# Patient Record
Sex: MALE | Race: OTHER | NOT HISPANIC OR LATINO | URBAN - METROPOLITAN AREA
[De-identification: names, ages, dates, MRNs, and addresses within clinical notes are randomized per-mention and may not be internally consistent; named-entity substitution may affect disease eponyms.]

---

## 2018-10-16 ENCOUNTER — EMERGENCY (EMERGENCY)
Facility: HOSPITAL | Age: 28
LOS: 1 days | Discharge: HOME | End: 2018-10-16
Attending: EMERGENCY MEDICINE
Payer: MEDICAID

## 2018-10-16 VITALS
HEART RATE: 65 BPM | HEIGHT: 66 IN | TEMPERATURE: 99 F | OXYGEN SATURATION: 96 % | RESPIRATION RATE: 16 BRPM | DIASTOLIC BLOOD PRESSURE: 91 MMHG | WEIGHT: 160.06 LBS | SYSTOLIC BLOOD PRESSURE: 162 MMHG

## 2018-10-16 PROCEDURE — 99283 EMERGENCY DEPT VISIT LOW MDM: CPT

## 2018-10-16 PROCEDURE — 72131 CT LUMBAR SPINE W/O DYE: CPT | Mod: 26

## 2018-10-16 PROCEDURE — 99284 EMERGENCY DEPT VISIT MOD MDM: CPT | Mod: 25

## 2018-10-16 PROCEDURE — 72131 CT LUMBAR SPINE W/O DYE: CPT

## 2018-10-16 NOTE — ED PROVIDER NOTE - CONSTITUTIONAL, MLM
normal... Uncomfortable appearing male, well nourished, awake, alert, oriented to person, place, time/situation and in mild apparent distress.

## 2018-10-16 NOTE — ED ADULT TRIAGE NOTE - WEIGHT IN KG
Health Maintenance Summary     Topic Due On Due Status Completed On    Colorectal Cancer Screening - Colonoscopy Oct 3, 2017 Overdue     IMMUNIZATION - DTaP/Tdap/Td Feb 20, 2023 Not Due Feb 20, 2013    Immunization-Influenza Sep 1, 2017 Overdue Jan 20, 2015     Oct 3, 1979 Overdue           Patient is due for topics as listed above, he wishes to proceed at this time, order (s) placed and patient given informationVaccine Information Statement(s) for Influenza (Inactivated) given and reviewed, questions answered, verbal consent given by Patient for injection(s) administered. Patient tolerated without incident. See immunization grid for documentation.    1.  Does the patient have a moderate to severe fever?  NO  2.  Has the patient had a serious reaction to a flu shot before?  NO  3.  Has the patient ever had Guillain-Crosby Syndrome within 6 weeks of a previous flu shot?  NO  4.  Does the patient have a serious allergy to eggs?  NO  5.  Does the patient have an allergy to latex?  NO       Note: This applies to [employer] Fluzone prefilled syringes only  6.  If person is answering for a child, is the child less than 6 months of age?  NO    A \"YES\" answer to any question above means the patient is not eligible to receive the vaccine.             .     72.6

## 2018-10-16 NOTE — ED PROVIDER NOTE - OBJECTIVE STATEMENT
29 yo male with no significant PMHx, slipped and fell while walking down slick stairs last evening, landed on his right lower back and since that time has had sharp, non radiating, low back pain w/o associated symptoms such as bowel or bladder disturbances. No hematuria. Did not hit head. No CP or SOB.

## 2018-10-16 NOTE — ED ADULT NURSE NOTE - CHPI ED NUR SYMPTOMS NEG
no difficulty bearing weight/no tingling/no abrasion/no stiffness/no fever/no numbness/no bruising/no deformity

## 2018-10-16 NOTE — ED ADULT NURSE NOTE - OBJECTIVE STATEMENT
received pt in FT Pt A&O c/o lower back pain after tripping and falling down 8 stairs on buttocks last night Denies hitting head. received pt in FT Pt A&O c/o lower back pain after tripping and falling down 8 stairs on buttocks last night Denies hitting head. Pt seen by dr Pastor

## 2019-08-20 NOTE — ED PROVIDER NOTE - CPE EDP EYES NORM
Patient brought to appointment by mother and father  Best phone number [phone]  Ok to leave detailed message.    Electronically Signed by: Edna Childress CMA       normal...

## 2021-05-17 ENCOUNTER — EMERGENCY (EMERGENCY)
Facility: HOSPITAL | Age: 31
LOS: 1 days | Discharge: HOME | End: 2021-05-17
Attending: EMERGENCY MEDICINE | Admitting: EMERGENCY MEDICINE
Payer: COMMERCIAL

## 2021-05-17 VITALS
HEART RATE: 70 BPM | SYSTOLIC BLOOD PRESSURE: 146 MMHG | DIASTOLIC BLOOD PRESSURE: 92 MMHG | TEMPERATURE: 98 F | OXYGEN SATURATION: 98 % | RESPIRATION RATE: 16 BRPM

## 2021-05-17 VITALS
SYSTOLIC BLOOD PRESSURE: 157 MMHG | OXYGEN SATURATION: 97 % | WEIGHT: 169.98 LBS | HEIGHT: 66 IN | RESPIRATION RATE: 18 BRPM | HEART RATE: 75 BPM | TEMPERATURE: 98 F | DIASTOLIC BLOOD PRESSURE: 98 MMHG

## 2021-05-17 LAB
ALBUMIN SERPL ELPH-MCNC: 3.8 G/DL — SIGNIFICANT CHANGE UP (ref 3.3–5)
ALP SERPL-CCNC: 61 U/L — SIGNIFICANT CHANGE UP (ref 40–120)
ALT FLD-CCNC: 29 U/L — SIGNIFICANT CHANGE UP (ref 12–78)
ANION GAP SERPL CALC-SCNC: 3 MMOL/L — LOW (ref 5–17)
AST SERPL-CCNC: 17 U/L — SIGNIFICANT CHANGE UP (ref 15–37)
BASOPHILS # BLD AUTO: 0.04 K/UL — SIGNIFICANT CHANGE UP (ref 0–0.2)
BASOPHILS NFR BLD AUTO: 0.7 % — SIGNIFICANT CHANGE UP (ref 0–2)
BILIRUB SERPL-MCNC: 0.6 MG/DL — SIGNIFICANT CHANGE UP (ref 0.2–1.2)
BUN SERPL-MCNC: 21 MG/DL — SIGNIFICANT CHANGE UP (ref 7–23)
CALCIUM SERPL-MCNC: 9 MG/DL — SIGNIFICANT CHANGE UP (ref 8.5–10.1)
CHLORIDE SERPL-SCNC: 108 MMOL/L — SIGNIFICANT CHANGE UP (ref 96–108)
CO2 SERPL-SCNC: 30 MMOL/L — SIGNIFICANT CHANGE UP (ref 22–31)
CREAT SERPL-MCNC: 0.64 MG/DL — SIGNIFICANT CHANGE UP (ref 0.5–1.3)
EOSINOPHIL # BLD AUTO: 0.16 K/UL — SIGNIFICANT CHANGE UP (ref 0–0.5)
EOSINOPHIL NFR BLD AUTO: 3 % — SIGNIFICANT CHANGE UP (ref 0–6)
GLUCOSE SERPL-MCNC: 80 MG/DL — SIGNIFICANT CHANGE UP (ref 70–99)
HCT VFR BLD CALC: 45.1 % — SIGNIFICANT CHANGE UP (ref 39–50)
HGB BLD-MCNC: 14.8 G/DL — SIGNIFICANT CHANGE UP (ref 13–17)
IMM GRANULOCYTES NFR BLD AUTO: 0.2 % — SIGNIFICANT CHANGE UP (ref 0–1.5)
LIDOCAIN IGE QN: 89 U/L — SIGNIFICANT CHANGE UP (ref 73–393)
LYMPHOCYTES # BLD AUTO: 1.97 K/UL — SIGNIFICANT CHANGE UP (ref 1–3.3)
LYMPHOCYTES # BLD AUTO: 36.4 % — SIGNIFICANT CHANGE UP (ref 13–44)
MCHC RBC-ENTMCNC: 26.1 PG — LOW (ref 27–34)
MCHC RBC-ENTMCNC: 32.8 GM/DL — SIGNIFICANT CHANGE UP (ref 32–36)
MCV RBC AUTO: 79.5 FL — LOW (ref 80–100)
MONOCYTES # BLD AUTO: 0.48 K/UL — SIGNIFICANT CHANGE UP (ref 0–0.9)
MONOCYTES NFR BLD AUTO: 8.9 % — SIGNIFICANT CHANGE UP (ref 2–14)
NEUTROPHILS # BLD AUTO: 2.75 K/UL — SIGNIFICANT CHANGE UP (ref 1.8–7.4)
NEUTROPHILS NFR BLD AUTO: 50.8 % — SIGNIFICANT CHANGE UP (ref 43–77)
NRBC # BLD: 0 /100 WBCS — SIGNIFICANT CHANGE UP (ref 0–0)
PLATELET # BLD AUTO: 262 K/UL — SIGNIFICANT CHANGE UP (ref 150–400)
POTASSIUM SERPL-MCNC: 4.3 MMOL/L — SIGNIFICANT CHANGE UP (ref 3.5–5.3)
POTASSIUM SERPL-SCNC: 4.3 MMOL/L — SIGNIFICANT CHANGE UP (ref 3.5–5.3)
PROT SERPL-MCNC: 8.3 G/DL — SIGNIFICANT CHANGE UP (ref 6–8.3)
RBC # BLD: 5.67 M/UL — SIGNIFICANT CHANGE UP (ref 4.2–5.8)
RBC # FLD: 12.9 % — SIGNIFICANT CHANGE UP (ref 10.3–14.5)
SODIUM SERPL-SCNC: 141 MMOL/L — SIGNIFICANT CHANGE UP (ref 135–145)
WBC # BLD: 5.41 K/UL — SIGNIFICANT CHANGE UP (ref 3.8–10.5)
WBC # FLD AUTO: 5.41 K/UL — SIGNIFICANT CHANGE UP (ref 3.8–10.5)

## 2021-05-17 PROCEDURE — 86618 LYME DISEASE ANTIBODY: CPT

## 2021-05-17 PROCEDURE — 86666 EHRLICHIA ANTIBODY: CPT

## 2021-05-17 PROCEDURE — 86780 TREPONEMA PALLIDUM: CPT

## 2021-05-17 PROCEDURE — 99284 EMERGENCY DEPT VISIT MOD MDM: CPT | Mod: 25

## 2021-05-17 PROCEDURE — 99284 EMERGENCY DEPT VISIT MOD MDM: CPT

## 2021-05-17 PROCEDURE — 86617 LYME DISEASE ANTIBODY: CPT

## 2021-05-17 PROCEDURE — 96375 TX/PRO/DX INJ NEW DRUG ADDON: CPT

## 2021-05-17 PROCEDURE — 96374 THER/PROPH/DIAG INJ IV PUSH: CPT

## 2021-05-17 PROCEDURE — 86753 PROTOZOA ANTIBODY NOS: CPT

## 2021-05-17 PROCEDURE — 96361 HYDRATE IV INFUSION ADD-ON: CPT

## 2021-05-17 PROCEDURE — 85025 COMPLETE CBC W/AUTO DIFF WBC: CPT

## 2021-05-17 PROCEDURE — 80053 COMPREHEN METABOLIC PANEL: CPT

## 2021-05-17 PROCEDURE — 36415 COLL VENOUS BLD VENIPUNCTURE: CPT

## 2021-05-17 PROCEDURE — 83690 ASSAY OF LIPASE: CPT

## 2021-05-17 RX ORDER — FAMOTIDINE 10 MG/ML
20 INJECTION INTRAVENOUS ONCE
Refills: 0 | Status: COMPLETED | OUTPATIENT
Start: 2021-05-17 | End: 2021-05-17

## 2021-05-17 RX ORDER — DIPHENHYDRAMINE HCL 50 MG
25 CAPSULE ORAL ONCE
Refills: 0 | Status: COMPLETED | OUTPATIENT
Start: 2021-05-17 | End: 2021-05-17

## 2021-05-17 RX ORDER — FAMOTIDINE 10 MG/ML
1 INJECTION INTRAVENOUS
Qty: 10 | Refills: 0
Start: 2021-05-17 | End: 2021-05-21

## 2021-05-17 RX ORDER — SODIUM CHLORIDE 9 MG/ML
1000 INJECTION INTRAMUSCULAR; INTRAVENOUS; SUBCUTANEOUS ONCE
Refills: 0 | Status: COMPLETED | OUTPATIENT
Start: 2021-05-17 | End: 2021-05-17

## 2021-05-17 RX ADMIN — SODIUM CHLORIDE 1000 MILLILITER(S): 9 INJECTION INTRAMUSCULAR; INTRAVENOUS; SUBCUTANEOUS at 16:17

## 2021-05-17 RX ADMIN — Medication 25 MILLIGRAM(S): at 16:16

## 2021-05-17 RX ADMIN — SODIUM CHLORIDE 1000 MILLILITER(S): 9 INJECTION INTRAMUSCULAR; INTRAVENOUS; SUBCUTANEOUS at 17:17

## 2021-05-17 RX ADMIN — FAMOTIDINE 20 MILLIGRAM(S): 10 INJECTION INTRAVENOUS at 16:16

## 2021-05-17 NOTE — ED PROVIDER NOTE - PATIENT PORTAL LINK FT
You can access the FollowMyHealth Patient Portal offered by [facility] by registering at the following website: [email or web address]. By joining [facility]’s FollowMyHealth portal, you will also be able to view your health information using other applications (apps) compatible with our system.

## 2021-05-17 NOTE — ED PROVIDER NOTE - CLINICAL SUMMARY MEDICAL DECISION MAKING FREE TEXT BOX
COVID 19 Moderna on the 7th Pt is a 29 yo male who presents to the ED with a cc of rash to bilateral hands. Pt with no significant past medical history. Reports that he received the Moderna vaccine on 5/7 (first vaccine). Pt reports no issues initially. Yesterday he noted that he began to develop a rash to the palms of his hand. He reports that he has also noted that his hand appear swollen and there is mild discomfort due to the swelling. Denies additional rash. Denies numbness or tingling to hand. Denies changes to lotions soaps, shampoos, medication, laundry detergents. Pt works as a  but wear gloves. No other close contacts with similar rashes. Does have exposure to neighbors cat but no other animal exposure. Denies prior similar episodes. Denies fever, chills, N/V, CP, SOB, abd pain, ext numbness or weakness. Pt presenting with rash to palms unlikely related to vaccine given onset was weeks later. Appears to be similar to what one would see with hand foot and mouth disease although only affecting palms. Could be subclinical presentation. Will treat for allergy, will avoid steroids as pt is in process of COVID 19 vaccine. Will obtain screening labs and monitor

## 2021-05-17 NOTE — ED PROVIDER NOTE - NSFOLLOWUPINSTRUCTIONS_ED_ALL_ED_FT
Return to the ED for any new or worsening symptoms  Take your medication as prescribed  Pepcid 1 tab 2 times a day begin tonight as you had your first dose here   Benadryl per label instructions as needed for itching. This medication is over the counter  Make sure to wash hands frequently   Follow up with dermatology in 1 week for a recheck   Advance activity as tolerated

## 2021-05-17 NOTE — ED ADULT NURSE NOTE - OBJECTIVE STATEMENT
Received pt alert and orientated pt stated he got a the covid vaccine the madrina on May 7th now about 2 days ago pt states his hands are swollen and his right hand especially middle finger is more swollen and has bumps on his hand. Pt denies swelling of his throat and face. Pt is able to walk and move all extremities. Pt denies SOB abd pain and chest pain. Call bell within reach. Freq rounding performed. Safety/Comfort maintained. Will continue to monitor.

## 2021-05-17 NOTE — ED PROVIDER NOTE - PROGRESS NOTE DETAILS
pt with improved rash to hands at this time. Advised that based on appearance seems similar to hand/foot/mouth. Adults tend to have subclinical course. Labs reviewed no acute abnormality noted. Advised need to follow up with dermatology. Copy of results provided all questions answered

## 2021-05-17 NOTE — ED PROVIDER NOTE - OBJECTIVE STATEMENT
Pt is a 31 yo male who presents to the ED with a cc of rash to bilateral hands. Pt with no significant past medical history. Reports that he received the Moderna vaccine on 5/7 (first vaccine). Pt reports no issues initially. Yesterday he noted that he began to develop a rash to the palms of his hand. He reports that he has also noted that his hand appear swollen and there is mild discomfort due to the swelling. Denies additional rash. Denies numbness or tingling to hand. Denies changes to lotions soaps, shampoos, medication, laundry detergents. Pt works as a  but wear gloves. No other close contacts with similar rashes. Does have exposure to neighbors cat but no other animal exposure. Denies prior similar episodes. Denies fever, chills, N/V, CP, SOB, abd pain, ext numbness or weakness.

## 2021-05-17 NOTE — ED PROVIDER NOTE - PHYSICAL EXAMINATION
pt with raised macular rash noted to bilateral palms extending to fingers does not cross wrists, not noted to dorsal aspect. No additional regions of rash noted including no rash to soles of feet. No lesions noted in mouth

## 2021-05-18 LAB — T PALLIDUM AB TITR SER: NEGATIVE — SIGNIFICANT CHANGE UP

## 2021-05-23 LAB
A PHAGOCYTOPH IGG TITR SER IF: SIGNIFICANT CHANGE UP TITER
B BURGDOR AB SER QL IA: POSITIVE — SIGNIFICANT CHANGE UP
B MICROTI IGG TITR SER: SIGNIFICANT CHANGE UP TITER
E CHAFFEENSIS IGG TITR SER IF: SIGNIFICANT CHANGE UP TITER

## 2022-01-01 NOTE — ED PROVIDER NOTE - HISTORY ATTESTATION, MLM
0768- Bedside and Verbal shift change report given to Ammy (oncoming nurse) by Gro (offgoing nurse). Report included the following information SBAR, Procedure Summary, Intake/Output, MAR, and Recent Results.      6275- shift assessment complete I have reviewed and confirmed nurses' notes...

## 2022-02-08 ENCOUNTER — EMERGENCY (EMERGENCY)
Facility: HOSPITAL | Age: 32
LOS: 1 days | Discharge: HOME | End: 2022-02-08
Attending: STUDENT IN AN ORGANIZED HEALTH CARE EDUCATION/TRAINING PROGRAM | Admitting: STUDENT IN AN ORGANIZED HEALTH CARE EDUCATION/TRAINING PROGRAM
Payer: MEDICAID

## 2022-02-08 VITALS
OXYGEN SATURATION: 98 % | SYSTOLIC BLOOD PRESSURE: 166 MMHG | HEIGHT: 66 IN | DIASTOLIC BLOOD PRESSURE: 94 MMHG | TEMPERATURE: 99 F | RESPIRATION RATE: 16 BRPM | WEIGHT: 149.91 LBS | HEART RATE: 110 BPM

## 2022-02-08 VITALS
OXYGEN SATURATION: 99 % | TEMPERATURE: 98 F | DIASTOLIC BLOOD PRESSURE: 81 MMHG | HEART RATE: 100 BPM | RESPIRATION RATE: 19 BRPM | SYSTOLIC BLOOD PRESSURE: 142 MMHG

## 2022-02-08 LAB
ALBUMIN SERPL ELPH-MCNC: 3.5 G/DL — SIGNIFICANT CHANGE UP (ref 3.3–5)
ALP SERPL-CCNC: 55 U/L — SIGNIFICANT CHANGE UP (ref 40–120)
ALT FLD-CCNC: 22 U/L — SIGNIFICANT CHANGE UP (ref 12–78)
ANION GAP SERPL CALC-SCNC: 4 MMOL/L — LOW (ref 5–17)
APTT BLD: 32.4 SEC — SIGNIFICANT CHANGE UP (ref 27.5–35.5)
AST SERPL-CCNC: 12 U/L — LOW (ref 15–37)
BASOPHILS # BLD AUTO: 0.03 K/UL — SIGNIFICANT CHANGE UP (ref 0–0.2)
BASOPHILS NFR BLD AUTO: 0.4 % — SIGNIFICANT CHANGE UP (ref 0–2)
BILIRUB SERPL-MCNC: 0.8 MG/DL — SIGNIFICANT CHANGE UP (ref 0.2–1.2)
BUN SERPL-MCNC: 11 MG/DL — SIGNIFICANT CHANGE UP (ref 7–23)
CALCIUM SERPL-MCNC: 8.5 MG/DL — SIGNIFICANT CHANGE UP (ref 8.5–10.1)
CHLORIDE SERPL-SCNC: 105 MMOL/L — SIGNIFICANT CHANGE UP (ref 96–108)
CO2 SERPL-SCNC: 30 MMOL/L — SIGNIFICANT CHANGE UP (ref 22–31)
CREAT SERPL-MCNC: 0.82 MG/DL — SIGNIFICANT CHANGE UP (ref 0.5–1.3)
D DIMER BLD IA.RAPID-MCNC: <150 NG/ML DDU — SIGNIFICANT CHANGE UP
EOSINOPHIL # BLD AUTO: 0.07 K/UL — SIGNIFICANT CHANGE UP (ref 0–0.5)
EOSINOPHIL NFR BLD AUTO: 0.9 % — SIGNIFICANT CHANGE UP (ref 0–6)
GLUCOSE SERPL-MCNC: 113 MG/DL — HIGH (ref 70–99)
HCT VFR BLD CALC: 43.6 % — SIGNIFICANT CHANGE UP (ref 39–50)
HGB BLD-MCNC: 14.3 G/DL — SIGNIFICANT CHANGE UP (ref 13–17)
IMM GRANULOCYTES NFR BLD AUTO: 0.3 % — SIGNIFICANT CHANGE UP (ref 0–1.5)
INR BLD: 1.06 RATIO — SIGNIFICANT CHANGE UP (ref 0.88–1.16)
LYMPHOCYTES # BLD AUTO: 1.65 K/UL — SIGNIFICANT CHANGE UP (ref 1–3.3)
LYMPHOCYTES # BLD AUTO: 21.8 % — SIGNIFICANT CHANGE UP (ref 13–44)
MCHC RBC-ENTMCNC: 26.5 PG — LOW (ref 27–34)
MCHC RBC-ENTMCNC: 32.8 GM/DL — SIGNIFICANT CHANGE UP (ref 32–36)
MCV RBC AUTO: 80.7 FL — SIGNIFICANT CHANGE UP (ref 80–100)
MONOCYTES # BLD AUTO: 0.85 K/UL — SIGNIFICANT CHANGE UP (ref 0–0.9)
MONOCYTES NFR BLD AUTO: 11.2 % — SIGNIFICANT CHANGE UP (ref 2–14)
NEUTROPHILS # BLD AUTO: 4.94 K/UL — SIGNIFICANT CHANGE UP (ref 1.8–7.4)
NEUTROPHILS NFR BLD AUTO: 65.4 % — SIGNIFICANT CHANGE UP (ref 43–77)
NRBC # BLD: 0 /100 WBCS — SIGNIFICANT CHANGE UP (ref 0–0)
PLATELET # BLD AUTO: 230 K/UL — SIGNIFICANT CHANGE UP (ref 150–400)
POTASSIUM SERPL-MCNC: 4.2 MMOL/L — SIGNIFICANT CHANGE UP (ref 3.5–5.3)
POTASSIUM SERPL-SCNC: 4.2 MMOL/L — SIGNIFICANT CHANGE UP (ref 3.5–5.3)
PROT SERPL-MCNC: 7.8 G/DL — SIGNIFICANT CHANGE UP (ref 6–8.3)
PROTHROM AB SERPL-ACNC: 12.4 SEC — SIGNIFICANT CHANGE UP (ref 10.6–13.6)
RBC # BLD: 5.4 M/UL — SIGNIFICANT CHANGE UP (ref 4.2–5.8)
RBC # FLD: 13.2 % — SIGNIFICANT CHANGE UP (ref 10.3–14.5)
SODIUM SERPL-SCNC: 139 MMOL/L — SIGNIFICANT CHANGE UP (ref 135–145)
TROPONIN I, HIGH SENSITIVITY RESULT: 4.7 NG/L — SIGNIFICANT CHANGE UP
WBC # BLD: 7.56 K/UL — SIGNIFICANT CHANGE UP (ref 3.8–10.5)
WBC # FLD AUTO: 7.56 K/UL — SIGNIFICANT CHANGE UP (ref 3.8–10.5)

## 2022-02-08 PROCEDURE — 99285 EMERGENCY DEPT VISIT HI MDM: CPT | Mod: 25

## 2022-02-08 PROCEDURE — 96374 THER/PROPH/DIAG INJ IV PUSH: CPT

## 2022-02-08 PROCEDURE — 71046 X-RAY EXAM CHEST 2 VIEWS: CPT

## 2022-02-08 PROCEDURE — 85025 COMPLETE CBC W/AUTO DIFF WBC: CPT

## 2022-02-08 PROCEDURE — 99285 EMERGENCY DEPT VISIT HI MDM: CPT

## 2022-02-08 PROCEDURE — 85379 FIBRIN DEGRADATION QUANT: CPT

## 2022-02-08 PROCEDURE — 36415 COLL VENOUS BLD VENIPUNCTURE: CPT

## 2022-02-08 PROCEDURE — 80053 COMPREHEN METABOLIC PANEL: CPT

## 2022-02-08 PROCEDURE — 93005 ELECTROCARDIOGRAM TRACING: CPT

## 2022-02-08 PROCEDURE — 85610 PROTHROMBIN TIME: CPT

## 2022-02-08 PROCEDURE — 71046 X-RAY EXAM CHEST 2 VIEWS: CPT | Mod: 26

## 2022-02-08 PROCEDURE — 84484 ASSAY OF TROPONIN QUANT: CPT

## 2022-02-08 PROCEDURE — 85730 THROMBOPLASTIN TIME PARTIAL: CPT

## 2022-02-08 PROCEDURE — 93010 ELECTROCARDIOGRAM REPORT: CPT

## 2022-02-08 RX ORDER — KETOROLAC TROMETHAMINE 30 MG/ML
15 SYRINGE (ML) INJECTION ONCE
Refills: 0 | Status: DISCONTINUED | OUTPATIENT
Start: 2022-02-08 | End: 2022-02-08

## 2022-02-08 RX ADMIN — Medication 15 MILLIGRAM(S): at 20:23

## 2022-02-08 NOTE — ED PROVIDER NOTE - PROGRESS NOTE DETAILS
Pt reports symptomatic relief. Chest pain free at this time. Discussed the results of all diagnostic testing in ED and copies of all reports given.   Pt was given an opportunity to have all questions answered to satisfaction.  Discussed the importance of prompt, close medical follow-up. ED return precautions discussed at length.  Pt verbalizes agreement and understanding of plan and ED return precautions. Pt well appearing, stable for DC home. No emergent concerns at this time. Pt reports symptomatic relief sp toradol. Chest pain free at this time. Discussed the results of all diagnostic testing in ED and copies of all reports given.   Pt was given an opportunity to have all questions answered to satisfaction.  Discussed the importance of prompt, close medical/cardio follow-up. ED return precautions discussed at length.  Pt verbalizes agreement and understanding of plan and ED return precautions. Pt well appearing, stable for DC home. No emergent concerns at this time.

## 2022-02-08 NOTE — ED PROVIDER NOTE - ATTENDING CONTRIBUTION TO CARE
30yo M otherwise healthy pw right sided chest pain radiating to  back, worse with inspiration and movement, pain started last night at rest, no sob, not worse with exertion, never had similar In past   no cough, no fever  ekg LBBB, no prior  will check labs, ekg, trop if negative needs close card fu

## 2022-02-08 NOTE — ED PROVIDER NOTE - CROS ED GI ALL NEG
Airway patent, Nasal mucosa clear. Mouth with normal mucosa. lips dry, mucous membranes moist negative...

## 2022-02-08 NOTE — ED PROVIDER NOTE - OBJECTIVE STATEMENT
32 yo M presents to ED c/o right chest wall pain radiating to right shoulder and into back x yesterday. pt states pain is constant, sharp, worse with movement and deep breaths. Denies SOB abd pain NV, fever/chills. pt states he has not taken anything for pain.

## 2022-02-08 NOTE — ED PROVIDER NOTE - CARE PROVIDER_API CALL
Harvey Sanchez)  Cardiovascular Disease; Internal Medicine  [address], [place] [id number]  Phone: [phone]  Fax: [phone]  Follow Up Time:

## 2022-02-08 NOTE — ED PROVIDER NOTE - PATIENT PORTAL LINK FT
You can access the FollowMyHealth Patient Portal offered by [facility] by registering at the following website: [email or web address]. By joining [facility]’s FollowMyHealth portal, you will also be able to view your health information using other applications (apps) compatible with our system.

## 2022-02-08 NOTE — ED ADULT NURSE NOTE - OBJECTIVE STATEMENT
Pt. received alert and oriented x3 with chief complaint of chest pain prior to arrival to ED. Pt. placed on continuous cardiac monitor with continuous pulse ox. EKG performed at bedside upon arrival.

## 2025-05-27 NOTE — ED ADULT TRIAGE NOTE - WEIGHT IN KG
Edelmira Jaquez  : 1946  Primary: [facility] (Medicare Managed)  Secondary:  [facility] [place] [address] [place] [address]  Phone: [phone]  Fax: [phone] Plan Frequency: up to 3x/week    Plan of Care/Certification Expiration Date: 25        Plan of Care/Certification Expiration Date:  Plan of Care/Certification Expiration Date: 25    Frequency/Duration:   Plan Frequency: up to 3x/week      Time In/Out:   Time In: 0400  Time Out: 0502      PT Visit Info:         Visit Count:  29    OUTPATIENT PHYSICAL THERAPY:   Discharge/Treatment Note 2025       Episode  (RA/Neck pain)               Treatment Diagnosis:    Neck pain  Acute bilateral knee pain  Multiple sclerosis (HCC)  Decreased strength of lower extremity  Unsteadiness on feet  Medical/Referring Diagnosis:    Chronic neck pain    Referring Physician:  Cammy Maloney MD MD Orders:  PT Eval and Treat   Return MD Appt:  25   Date of Onset:  20  Allergies:   Statins  Restrictions/Precautions:   None      Interventions Planned (Treatment may consist of any combination of the following):     See Assessment Note    Subjective Comments:   Pt notes that she only has minor pain today after being in and out of doctors offices a lot today.   Initial Pain Level::     10  Post Session Pain Level:       /10  Medications Last Reviewed: 2025  Updated Objective Findings:   Findings consistent with initial evaluation unless noted otherwise.   Update 25:  ROM:   Cervical L rotation = 73 deg;  R rotation = 60 deg     MMT:   0-5 scale Left _/5 Right _/5   Hip flexion 5/5 5/5   Hip extension 5/5 5/5   Hip abduction 5/5 5/5   Hip adduciton 5/5 5/5   Knee flexion 5/5 5/5   Knee extension 5/5 5/5   Ankle dorsiflexion 5/5 5/5   Ankle plantarflexion 5/5 5/5        Special tests:   Single leg stance time:  L SLS = 8.4 second;  R SLS = 8.9 seconds     Timed up and go test =  8.3  68